# Patient Record
Sex: FEMALE
[De-identification: names, ages, dates, MRNs, and addresses within clinical notes are randomized per-mention and may not be internally consistent; named-entity substitution may affect disease eponyms.]

---

## 2018-03-26 ENCOUNTER — HOSPITAL ENCOUNTER (EMERGENCY)
Dept: HOSPITAL 88 - ER | Age: 38
LOS: 1 days | Discharge: LEFT BEFORE DISCHARGE | End: 2018-03-27
Payer: MEDICARE

## 2018-03-26 VITALS — HEIGHT: 66 IN | WEIGHT: 200 LBS | BODY MASS INDEX: 32.14 KG/M2

## 2018-03-26 DIAGNOSIS — R50.9: Primary | ICD-10-CM

## 2018-03-26 LAB
BACTERIA URNS QL MICRO: (no result) /HPF
BILIRUB UR QL: NEGATIVE
CLARITY UR: CLEAR
COLOR UR: YELLOW
DEPRECATED RBC URNS MANUAL-ACNC: (no result) /HPF (ref 0–5)
EPI CELLS URNS QL MICRO: (no result) /LPF
KETONES UR QL STRIP.AUTO: NEGATIVE
LEUKOCYTE ESTERASE UR QL STRIP.AUTO: NEGATIVE
NITRITE UR QL STRIP.AUTO: NEGATIVE
PROT UR QL STRIP.AUTO: NEGATIVE
SP GR UR STRIP: 1.01 (ref 1.01–1.02)
UROBILINOGEN UR STRIP-MCNC: 0.2 MG/DL (ref 0.2–1)
WBC #/AREA URNS HPF: (no result) /HPF (ref 0–5)

## 2018-03-26 PROCEDURE — 70450 CT HEAD/BRAIN W/O DYE: CPT

## 2018-03-26 PROCEDURE — 81001 URINALYSIS AUTO W/SCOPE: CPT

## 2018-03-26 PROCEDURE — 71045 X-RAY EXAM CHEST 1 VIEW: CPT

## 2018-03-26 NOTE — DIAGNOSTIC IMAGING REPORT
History: Fever, headache



Comparison studies: 

None



Technique: 

Axial images were obtained from the skull base to the vertex.  

Coronal and sagittal reconstructions obtained from the axial data.



Findings:



Scalp/skull: 

No abnormalities. No fractures, blastic or lytic lesions.



Extra-axial spaces: 

No masses.  No fluid collections.



Brain sulci: Appropriate for age.

Ventricles: Normal in size and configuration. No hydrocephalus.



Parenchyma: 

No abnormal densities. 

No masses, hemorrhage, acute or chronic cortical vascular insults.



Sellar/suprasellar region: No abnormalities

Craniocervical junction: Patent foramen magnum.  No Chiari one malformation.



Incidental nonobstructing retention cyst in the right maxillary sinus



IMPRESSION:



No intracranial abnormalities.



Signed by: Dr. Baljinder Amado M.D. on 3/26/2018 9:47 PM

## 2018-03-26 NOTE — DIAGNOSTIC IMAGING REPORT
CHEST SINGLE (PORTABLE), 3/26/2018 8:36 PM



Technique: CHEST SINGLE (PORTABLE)

Comparison: None

Clinical history: Fever



Findings:

Unremarkable portable appearance of the heart, mediastinum, lungs and pleural

spaces.



Impression:

1. Lines/Tubes: None

2. No acute abnormality.



Signed by: Dr Francisca Douglass MD on 3/26/2018 10:48 PM